# Patient Record
Sex: MALE | Race: BLACK OR AFRICAN AMERICAN
[De-identification: names, ages, dates, MRNs, and addresses within clinical notes are randomized per-mention and may not be internally consistent; named-entity substitution may affect disease eponyms.]

---

## 2017-04-02 ENCOUNTER — HOSPITAL ENCOUNTER (EMERGENCY)
Dept: HOSPITAL 74 - JERFT | Age: 22
Discharge: HOME | End: 2017-04-02
Payer: COMMERCIAL

## 2017-04-02 VITALS — BODY MASS INDEX: 23.7 KG/M2

## 2017-04-02 VITALS — SYSTOLIC BLOOD PRESSURE: 137 MMHG | HEART RATE: 93 BPM | TEMPERATURE: 98 F | DIASTOLIC BLOOD PRESSURE: 71 MMHG

## 2017-04-02 DIAGNOSIS — S89.82XA: Primary | ICD-10-CM
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
## 2017-04-02 NOTE — PDOC
History of Present Illness





- General


Chief Complaint: Injury


Stated Complaint: LT KNEE INJURY


History Source: Patient


Exam Limitations: No Limitations





- History of Present Illness


Initial Comments: 





04/02/17 18:38


21 year old male with no significant medical or surgical history presents 

complaining of pain to left knee after landing incorrectly while playing 

basketball. Reports landed and knee " buckled". Presented with knee brace in 

place.  Denies numbness or tingling in toes.  States after fall was able to 

walk to his home where he called the ambulance.   


Occurred: reports: just prior to arrival


Severity: reports: mild


Pain Location: reports: lower extremity


Method of Injury: Yes: fall


Modifying Factors: improves with: immobilization, rest


Loss of Consciousness: no loss of consciousness


Associated Symptoms (Fall): denies symptoms





Past History





- Travel


Traveled outside of the country in the last 30 days: No


Close contact w/someone who was outside of country & ill: No





- Past Medical History


Allergies/Adverse Reactions: 


 Allergies











Allergy/AdvReac Type Severity Reaction Status Date / Time


 


No Known Allergies Allergy   Verified 04/02/17 18:09











Home Medications: 


Ambulatory Orders





Ibuprofen [Motrin -] 600 mg PO TID #21 tablet 04/02/17 








Other medical history: none





- Psycho/Social/Smoking Cessation Hx


Anxiety: No


Suicidal Ideation: No


Smoking History: Never smoked


Have you smoked in the past 12 months: No


Information on smoking cessation initiated: No


Hx Alcohol Use: No


Drug/Substance Use Hx: No


Substance Use Type: None





Trauma Specific PMHX





- Complaint Specific PMHX


Back Injury: No


Neck Injury: No


Hx Sacro Iliac Joint Dysfunction: No





*Physical Exam





- Vital Signs


 Last Vital Signs











Temp Pulse Resp BP Pulse Ox


 


 98.0 F   93 H  18   137/71   100 


 


 04/02/17 18:09  04/02/17 18:09  04/02/17 18:09  04/02/17 18:09  04/02/17 18:09














- Physical Exam


General Appearance: Yes: Nourished, Appropriately Dressed.  No: Apparent 

Distress


HEENT: positive: EOMI, FAUSTO, TMs Normal, Pharynx Normal


Neck: positive: Supple.  negative: Lymphadenopathy (R), Lymphadenopathy (L)


Respiratory/Chest: positive: Lungs Clear, Normal Breath Sounds.  negative: 

Respiratory Distress, Accessory Muscle Use


Cardiovascular: positive: Regular Rhythm, Regular Rate, S1, S2


Musculoskeletal: positive: Other (FROM of left knee, no swelling, no effusion,

no crepitus, no pain with valgus or varus maneuver ).  negative: Decreased 

Range of Motion


Extremity: positive: Normal Capillary Refill


Neurologic: positive: CNs II-XII NML intact, Fully Oriented, Alert, Normal Mood/

Affect, Motor Strength 5/5





Medical Decision Making





- Medical Decision Making


04/02/17 18:42


21 year old male with no significant medical or surgical history with injury to 

left knee 





ibuprofen 600mg po 


xray of left knee 





04/02/17 19:52


knee xray appears to be intact with no fracture


rx: ibuprofen 


 ace wrap applied 





*DC/Admit/Observation/Transfer


Diagnosis at time of Disposition: 


Knee injury


Qualifiers:


 Encounter type: initial encounter Laterality: left Qualified Code(s): S89.92XA 

- Unspecified injury of left lower leg, initial encounter





- Discharge Dispostion


Disposition: HOME


Condition at time of disposition: Good


Admit: No





- Prescriptions


Prescriptions: 


Ibuprofen [Motrin -] 600 mg PO TID #21 tablet





- Referrals


Referrals: 


Vinay Oates MD [Staff Physician] - 





- Patient Instructions


Printed Discharge Instructions:  DI for Knee Pain


Additional Instructions: 


Elevate leg at rest, may apply ice compress for 20 minutes to leg.  Wear ace 

wrap for comfort and remove for sleeping and showering

## 2022-10-14 ENCOUNTER — HOSPITAL ENCOUNTER (EMERGENCY)
Dept: HOSPITAL 74 - JER | Age: 27
Discharge: HOME | End: 2022-10-14
Payer: COMMERCIAL

## 2022-10-14 VITALS
TEMPERATURE: 98.3 F | HEART RATE: 76 BPM | RESPIRATION RATE: 19 BRPM | DIASTOLIC BLOOD PRESSURE: 82 MMHG | SYSTOLIC BLOOD PRESSURE: 118 MMHG

## 2022-10-14 VITALS — BODY MASS INDEX: 21.7 KG/M2

## 2022-10-14 DIAGNOSIS — N12: Primary | ICD-10-CM

## 2022-10-14 LAB
ALBUMIN SERPL-MCNC: 4.4 G/DL (ref 3.4–5)
ALP SERPL-CCNC: 64 U/L (ref 45–117)
ALT SERPL-CCNC: 29 U/L (ref 13–61)
ANION GAP SERPL CALC-SCNC: 10 MMOL/L (ref 8–16)
APPEARANCE UR: CLEAR
AST SERPL-CCNC: 24 U/L (ref 15–37)
BASOPHILS # BLD: 0.4 % (ref 0–2)
BILIRUB SERPL-MCNC: 0.8 MG/DL (ref 0.2–1)
BILIRUB UR STRIP.AUTO-MCNC: NEGATIVE MG/DL
BUN SERPL-MCNC: 24 MG/DL (ref 7–18)
CALCIUM SERPL-MCNC: 9.8 MG/DL (ref 8.5–10.1)
CHLORIDE SERPL-SCNC: 101 MMOL/L (ref 98–107)
CO2 SERPL-SCNC: 27 MMOL/L (ref 21–32)
COLOR UR: YELLOW
CREAT SERPL-MCNC: 1.5 MG/DL (ref 0.55–1.3)
DEPRECATED RDW RBC AUTO: 13.4 % (ref 11.9–15.9)
EOSINOPHIL # BLD: 0.1 % (ref 0–4.5)
GLUCOSE SERPL-MCNC: 87 MG/DL (ref 74–106)
HCT VFR BLD CALC: 50.2 % (ref 35.4–49)
HGB BLD-MCNC: 17.2 GM/DL (ref 11.7–16.9)
INR BLD: 1.17 (ref 0.83–1.09)
KETONES UR QL STRIP: (no result)
LEUKOCYTE ESTERASE UR QL STRIP.AUTO: (no result)
LIPASE SERPL-CCNC: 32 U/L (ref 73–393)
LYMPHOCYTES # BLD: 7.7 % (ref 8–40)
MCH RBC QN AUTO: 30.6 PG (ref 25.7–33.7)
MCHC RBC AUTO-ENTMCNC: 34.3 G/DL (ref 32–35.9)
MCV RBC: 89.1 FL (ref 80–96)
MONOCYTES # BLD AUTO: 10.5 % (ref 3.8–10.2)
NEUTROPHILS # BLD: 81.3 % (ref 42.8–82.8)
NITRITE UR QL STRIP: NEGATIVE
PH UR: 6 [PH] (ref 5–8)
PLATELET # BLD AUTO: 203 10^3/UL (ref 134–434)
PMV BLD: 7.8 FL (ref 7.5–11.1)
PROT SERPL-MCNC: 8.3 G/DL (ref 6.4–8.2)
PROT UR QL STRIP: (no result)
PROT UR QL STRIP: NEGATIVE
PT PNL PPP: 13.5 SEC (ref 9.7–13)
RBC # BLD AUTO: 5.64 M/MM3 (ref 4–5.6)
SODIUM SERPL-SCNC: 138 MMOL/L (ref 136–145)
SP GR UR: 1.02 (ref 1.01–1.03)
UROBILINOGEN UR STRIP-MCNC: 0.2 MG/DL (ref 0.2–1)
WBC # BLD AUTO: 6.9 K/MM3 (ref 4–10)

## 2022-10-14 PROCEDURE — 3E0337Z INTRODUCTION OF ELECTROLYTIC AND WATER BALANCE SUBSTANCE INTO PERIPHERAL VEIN, PERCUTANEOUS APPROACH: ICD-10-PCS

## 2022-10-14 PROCEDURE — 3E033GC INTRODUCTION OF OTHER THERAPEUTIC SUBSTANCE INTO PERIPHERAL VEIN, PERCUTANEOUS APPROACH: ICD-10-PCS

## 2022-10-14 PROCEDURE — 3E033NZ INTRODUCTION OF ANALGESICS, HYPNOTICS, SEDATIVES INTO PERIPHERAL VEIN, PERCUTANEOUS APPROACH: ICD-10-PCS
